# Patient Record
Sex: MALE | Race: WHITE | Employment: OTHER | URBAN - METROPOLITAN AREA
[De-identification: names, ages, dates, MRNs, and addresses within clinical notes are randomized per-mention and may not be internally consistent; named-entity substitution may affect disease eponyms.]

---

## 2018-08-01 ENCOUNTER — HOSPITAL ENCOUNTER (OUTPATIENT)
Dept: SLEEP CENTER | Age: 68
Discharge: HOME | End: 2018-08-01
Attending: INTERNAL MEDICINE | Admitting: INTERNAL MEDICINE

## 2018-08-01 VITALS
HEIGHT: 69 IN | WEIGHT: 278 LBS | OXYGEN SATURATION: 94 % | SYSTOLIC BLOOD PRESSURE: 132 MMHG | DIASTOLIC BLOOD PRESSURE: 63 MMHG | HEART RATE: 111 BPM | BODY MASS INDEX: 41.18 KG/M2

## 2018-08-01 DIAGNOSIS — G47.33 OSA (OBSTRUCTIVE SLEEP APNEA): ICD-10-CM

## 2018-08-01 DIAGNOSIS — Z87.891 EX-CIGARETTE SMOKER: ICD-10-CM

## 2018-08-01 DIAGNOSIS — E66.01 MORBID OBESITY (HCC): ICD-10-CM

## 2018-08-01 DIAGNOSIS — R06.09 DYSPNEA ON EXERTION: ICD-10-CM

## 2018-08-01 PROCEDURE — 99204 OFFICE O/P NEW MOD 45 MIN: CPT | Performed by: INTERNAL MEDICINE

## 2018-08-01 ASSESSMENT — SLEEP AND FATIGUE QUESTIONNAIRES
NECK CIRCUMFERENCE (INCHES): 19.5
ESS TOTAL SCORE: 11
HOW LIKELY ARE YOU TO NOD OFF OR FALL ASLEEP WHILE LYING DOWN TO REST IN THE AFTERNOON WHEN CIRCUMSTANCES PERMIT: 3
HOW LIKELY ARE YOU TO NOD OFF OR FALL ASLEEP IN A CAR, WHILE STOPPED FOR A FEW MINUTES IN TRAFFIC: 0
HOW LIKELY ARE YOU TO NOD OFF OR FALL ASLEEP WHILE SITTING QUIETLY AFTER LUNCH WITHOUT ALCOHOL: 2
HOW LIKELY ARE YOU TO NOD OFF OR FALL ASLEEP WHILE WATCHING TV: 3
HOW LIKELY ARE YOU TO NOD OFF OR FALL ASLEEP WHEN YOU ARE A PASSENGER IN A CAR FOR AN HOUR WITHOUT A BREAK: 1
HOW LIKELY ARE YOU TO NOD OFF OR FALL ASLEEP WHILE SITTING AND TALKING TO SOMEONE: 0
HOW LIKELY ARE YOU TO NOD OFF OR FALL ASLEEP WHILE SITTING INACTIVE IN A PUBLIC PLACE: 2
HOW LIKELY ARE YOU TO NOD OFF OR FALL ASLEEP WHILE SITTING AND READING: 0

## 2018-08-01 NOTE — CONSULTS
Social History Narrative    No narrative on file       Prior to Admission medications    Medication Sig Start Date End Date Taking?  Authorizing Provider   glipiZIDE (GLUCOTROL) 5 MG tablet Take 5 mg by mouth 2 times daily (before meals)   Yes Historical Provider, MD   losartan (COZAAR) 100 MG tablet Take 100 mg by mouth daily   Yes Historical Provider, MD   carvedilol (COREG) 25 MG tablet Take 25 mg by mouth 2 times daily (with meals)   Yes Historical Provider, MD   metFORMIN (GLUCOPHAGE) 500 MG tablet Take 1,000 mg by mouth 2 times daily (with meals)   Yes Historical Provider, MD   amLODIPine (NORVASC) 5 MG tablet Take 5 mg by mouth daily   Yes Historical Provider, MD   pravastatin (PRAVACHOL) 80 MG tablet Take 80 mg by mouth daily   Yes Historical Provider, MD   PARoxetine (PAXIL) 20 MG tablet Take 20 mg by mouth every morning   Yes Historical Provider, MD   cetirizine (ZYRTEC) 10 MG tablet Take 10 mg by mouth daily   Yes Historical Provider, MD   aspirin EC 81 MG EC tablet Take 1 tablet by mouth daily 7/7/16  Yes Gilma Ch MD   apixaban (ELIQUIS) 5 MG TABS tablet Take 1 tablet by mouth 2 times daily 7/7/16  Yes Gilma Ch MD   hydrochlorothiazide (HYDRODIURIL) 25 MG tablet Take 25 mg by mouth daily    Historical Provider, MD       Allergies as of 08/01/2018 - Review Complete 08/01/2018   Allergen Reaction Noted    Ace inhibitors Other (See Comments) 09/27/2010    Lipitor [atorvastatin] Hives 07/07/2016    Hydrochlorothiazide Rash 01/05/2018       Patient Active Problem List   Diagnosis    EZEKIEL (obstructive sleep apnea)    Ex-cigarette smoker    Dyspnea on exertion    Morbid obesity ([address]       Past Medical History:   Diagnosis Date    Arthritis     Diabetes mellitus ([facility]     Hyperlipidemia     Hypertension        Neck surgery for cancer in 1992-96    No significant Family history      Objective:     Vitals:    08/01/18 1317   BP: 132/63   Pulse: 111   SpO2: 94%   Weight: 278 lb (126.1 kg) Height: 5' 9\" (1.753 m)     Neck circumference: 19.5  Inches  [place] - Total score: 11    Gen: No distress. Eyes: PERRL. No sclera icterus. No conjunctival injection. ENT: No discharge. Pharynx clear. External appearance of ears and nose normal.OVERJET  Neck: Trachea midline. No obvious mass. Resp: No accessory muscle use. No crackles. No wheezes. No rhonchi. No dullness on percussion. CV: Regular rate. Regular rhythm. No murmur or rub. No edema. GI: Non-tender. Non-distended. No hernia. Skin: Warm, dry, normal texture and turgor. No nodule on exposed extremities. Lymph: No cervical LAD. No supraclavicular LAD. M/S: No cyanosis. No clubbing. No joint deformity. Psych: Oriented x 3. No anxiety. Awake. Alert. Intact judgement and insight.     Mallampati Airway Classification:   []1 []2 []3 [x]4        Sleep Complaints/Symptoms:        Duration of Sleep Complaints: 10 years      CPAP Usage:    [x]  Patient has never worn CPAP  []  Patient has worn CPAP previously but discontinued use  []  Current PAP user,  [years]   []  Patient Tolerates Well   []  Patient Does Not Tolerate     []  Patient Uses CPAP      []  More Than 4 Hours      []  Less Than 4 Hours  []  CPAP/BPAP/ASV Pressure Readings   []  CPAP Pressure      cm H20   []  BPAP Pressure       cm H20   []  ASV Pressure         cm H20      Assessment:      Diagnosis:       Plan:        Sleep Study:      []  HST - Home Sleep Study   [x]  PSG - Overnight Diagnostic Polysomnogram     []  CPAP Titration    [] Split Night Study    [] BiLevel Titration    [] ASV - Auto-Servo Ventilation Titration     []  PAP Nap Test       []  MSLT - Multiple Sleep Latency Test   []  MWT - Maintenance of Wakefulness Test    PAP Therapy:     []  Patient to be seen for new mask fitting/desensitization   []  AutoPAP Titration    []  CPAP supplies and equipment at ________cmH2O    []  Continue same CPAP pressure   []  Change CPAP pressure to _______cm H2O   []  CPAP

## 2018-08-07 PROBLEM — I49.5 TACHY-BRADY SYNDROME (HCC): Status: ACTIVE | Noted: 2018-08-07

## 2018-08-15 ENCOUNTER — HOSPITAL ENCOUNTER (OUTPATIENT)
Dept: SLEEP CENTER | Age: 68
Discharge: HOME | End: 2018-08-15
Attending: INTERNAL MEDICINE | Admitting: INTERNAL MEDICINE

## 2018-08-15 VITALS — BODY MASS INDEX: 41.18 KG/M2 | WEIGHT: 278 LBS | HEIGHT: 69 IN

## 2018-08-15 NOTE — ANESTHESIA PRE-OP
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 [place], wears hearing aids    RESPIRATORY: EZEKIEL, testing results pending. Former smoker, 2ppd x 26 years, quit 1992. Able to lie flat. CXR: 8/17/2018  No acute airspace disease. CARDIOVASCULAR: HTN, HLD, AF. Tachy-shahriar syndrome. Planned pacer insertion. On beta blocker, ASA, and Eliquis, last dose 8/18/2018    GASTROINTESTINAL:  Denies reflux    GENITOURINARY:  Neg     INTEGUMENT:  Psoriasis, elbows and knees. On Otezla. HEMATOLOGIC/LYMPHATIC:     ENDOCRINE: DM, last HbA1c      MUSCULOSKELETAL: OA, left hip. Right fx ankle, 1974. No hardware. Left rotator repair, full ROM    NEUROLOGICAL:  RLS    BEHAVIOR/PSYCH:   Anxiety. Daily ETOH, 2 shots of liquor per night. Alert and oriented x 4. Questions answered.  Understanding verbalized    PHYSICAL EXAM:  Airway Exam:  Head/Neck movement: full  Thyromental Distance: > 3 FB  History of difficult intubation:  None  Mallampati Classification:  Class II  Teeth: denies missing, loose or chipped teeth        LUNGS:  No increased work of breathing, good air exchange, clear to auscultation bilaterally, no crackles or wheezing  CARDIOVASCULAR:  Normal apical impulse, regular rate and rhythm, normal S1 and S2, no S3 or S4, and no murmur noted    Testing Results:    EKG:  Atrial fibrillation, HR 98  Incomplete right bundle branch block   LABS:      CBC  Lab Results   Component Value Date/Time    WBC 8.6 08/17/2018 08:19 AM    HGB 15.0 08/17/2018 08:19 AM    HCT 47.1 08/17/2018 08:19 AM     08/17/2018 08:19 AM     RENAL  Lab Results   Component Value Date/Time     08/17/2018 08:19 AM    K 5.2 (H) 08/17/2018 08:19 AM    CL 98 (L) 08/17/2018 08:19 AM    CO2 29 08/17/2018 08:19 AM    BUN 13 08/17/2018 08:19 AM    CREATININE 0.8 (L) 08/17/2018 08:19 AM    GLUCOSE 178 (H) 08/17/2018 08:19 AM     COAGS  Lab Results   Component Value Date/Time    PROTIME 11.7 08/17/2018 08:19 AM    INR 1.03 08/17/2018

## 2018-08-17 ENCOUNTER — HOSPITAL ENCOUNTER (OUTPATIENT)
Dept: PREADMISSION TESTING | Age: 68
Discharge: HOME | End: 2018-08-17
Attending: SURGERY | Admitting: SURGERY

## 2018-08-17 VITALS
SYSTOLIC BLOOD PRESSURE: 127 MMHG | DIASTOLIC BLOOD PRESSURE: 78 MMHG | HEIGHT: 69 IN | WEIGHT: 275 LBS | RESPIRATION RATE: 16 BRPM | OXYGEN SATURATION: 95 % | TEMPERATURE: 97.1 F | BODY MASS INDEX: 40.73 KG/M2 | HEART RATE: 86 BPM

## 2018-08-17 LAB
ANION GAP SERPL CALCULATED.3IONS-SCNC: 12 MMOL/L (ref 4–16)
APTT: 31.2 SECONDS (ref 21.2–33)
BUN BLDV-MCNC: 13 MG/DL (ref 6–23)
CALCIUM SERPL-MCNC: 10 MG/DL (ref 8.3–10.6)
CHLORIDE BLD-SCNC: 98 MMOL/L (ref 99–110)
CO2: 29 MMOL/L (ref 21–32)
CREAT SERPL-MCNC: 0.8 MG/DL (ref 0.9–1.3)
EKG ATRIAL RATE: 468 BPM
EKG DIAGNOSIS: NORMAL
EKG Q-T INTERVAL: 348 MS
EKG QRS DURATION: 96 MS
EKG QTC CALCULATION (BAZETT): 444 MS
EKG R AXIS: -9 DEGREES
EKG T AXIS: 2 DEGREES
EKG VENTRICULAR RATE: 98 BPM
GFR AFRICAN AMERICAN: >60 ML/MIN/1.73M2
GFR NON-AFRICAN AMERICAN: >60 ML/MIN/1.73M2
GLUCOSE BLD-MCNC: 178 MG/DL (ref 70–99)
HCT VFR BLD CALC: 47.1 % (ref 42–52)
HEMOGLOBIN: 15 GM/DL (ref 13.5–18)
INR BLD: 1.03 INDEX
MCH RBC QN AUTO: 32.3 PG (ref 27–31)
MCHC RBC AUTO-ENTMCNC: 31.8 % (ref 32–36)
MCV RBC AUTO: 101.3 FL (ref 78–100)
PDW BLD-RTO: 13.1 % (ref 11.7–14.9)
PLATELET # BLD: 279 K/CU MM (ref 140–440)
PMV BLD AUTO: 9.7 FL (ref 7.5–11.1)
POTASSIUM SERPL-SCNC: 5.2 MMOL/L (ref 3.5–5.1)
PROTHROMBIN TIME: 11.7 SECONDS (ref 9.12–12.5)
RBC # BLD: 4.65 M/CU MM (ref 4.6–6.2)
SODIUM BLD-SCNC: 139 MMOL/L (ref 135–145)
WBC # BLD: 8.6 K/CU MM (ref 4–10.5)

## 2018-08-17 RX ORDER — CHLORAL HYDRATE 500 MG
2000 CAPSULE ORAL 2 TIMES DAILY
COMMUNITY
End: 2019-06-20

## 2018-08-17 RX ORDER — BETAMETHASONE DIPROPIONATE 0.5 MG/G
CREAM TOPICAL DAILY
COMMUNITY

## 2018-08-20 ENCOUNTER — HOSPITAL ENCOUNTER (OUTPATIENT)
Dept: SLEEP CENTER | Age: 68
Discharge: HOME | End: 2018-08-20
Attending: INTERNAL MEDICINE | Admitting: INTERNAL MEDICINE

## 2018-08-21 NOTE — PROGRESS NOTES
8/21/2018  sleep study  for Casandra Sotomayor  1950 is complete. Results are pending physician review. Casandra Sotomayor, 1950, is scheduled for a follow up at [facility] on   October 17, 2018 at 1500 with Dr. Natalia Machado.       Electronically signed by Michael Plata on 8/21/2018 at 5:51 AM

## 2018-08-24 PROBLEM — R00.1 BRADYCARDIA: Status: ACTIVE | Noted: 2018-08-24

## 2018-10-10 ENCOUNTER — HOSPITAL ENCOUNTER (OUTPATIENT)
Dept: SLEEP CENTER | Age: 68
Discharge: HOME | End: 2018-10-10
Payer: COMMERCIAL

## 2018-10-10 DIAGNOSIS — R06.09 DYSPNEA ON EXERTION: ICD-10-CM

## 2018-10-10 DIAGNOSIS — G47.33 OSA (OBSTRUCTIVE SLEEP APNEA): ICD-10-CM

## 2018-10-10 DIAGNOSIS — E66.01 MORBID OBESITY (HCC): ICD-10-CM

## 2018-10-10 DIAGNOSIS — Z87.891 EX-CIGARETTE SMOKER: ICD-10-CM

## 2018-10-10 PROCEDURE — 99214 OFFICE O/P EST MOD 30 MIN: CPT | Performed by: INTERNAL MEDICINE

## 2018-10-10 ASSESSMENT — SLEEP AND FATIGUE QUESTIONNAIRES
ESS TOTAL SCORE: 4
HOW LIKELY ARE YOU TO NOD OFF OR FALL ASLEEP WHILE SITTING AND TALKING TO SOMEONE: 0
HOW LIKELY ARE YOU TO NOD OFF OR FALL ASLEEP WHILE LYING DOWN TO REST IN THE AFTERNOON WHEN CIRCUMSTANCES PERMIT: 3
HOW LIKELY ARE YOU TO NOD OFF OR FALL ASLEEP IN A CAR, WHILE STOPPED FOR A FEW MINUTES IN TRAFFIC: 0
HOW LIKELY ARE YOU TO NOD OFF OR FALL ASLEEP WHEN YOU ARE A PASSENGER IN A CAR FOR AN HOUR WITHOUT A BREAK: 0
HOW LIKELY ARE YOU TO NOD OFF OR FALL ASLEEP WHILE SITTING AND READING: 0
HOW LIKELY ARE YOU TO NOD OFF OR FALL ASLEEP WHILE WATCHING TV: 1
HOW LIKELY ARE YOU TO NOD OFF OR FALL ASLEEP WHILE SITTING INACTIVE IN A PUBLIC PLACE: 0
HOW LIKELY ARE YOU TO NOD OFF OR FALL ASLEEP WHILE SITTING QUIETLY AFTER LUNCH WITHOUT ALCOHOL: 0

## 2018-10-10 ASSESSMENT — ENCOUNTER SYMPTOMS
EYES NEGATIVE: 1
GASTROINTESTINAL NEGATIVE: 1
RESPIRATORY NEGATIVE: 1

## 2018-11-05 ENCOUNTER — TELEPHONE (OUTPATIENT)
Dept: CARDIOLOGY CLINIC | Age: 68
End: 2018-11-05

## 2018-11-07 ENCOUNTER — INITIAL CONSULT (OUTPATIENT)
Dept: CARDIOLOGY CLINIC | Age: 68
End: 2018-11-07
Payer: COMMERCIAL

## 2018-11-07 VITALS
WEIGHT: 280 LBS | DIASTOLIC BLOOD PRESSURE: 84 MMHG | SYSTOLIC BLOOD PRESSURE: 138 MMHG | HEART RATE: 84 BPM | HEIGHT: 69 IN | BODY MASS INDEX: 41.47 KG/M2

## 2018-11-07 DIAGNOSIS — R06.02 SOB (SHORTNESS OF BREATH): ICD-10-CM

## 2018-11-07 DIAGNOSIS — I48.19 PERSISTENT ATRIAL FIBRILLATION (HCC): Primary | ICD-10-CM

## 2018-11-07 PROCEDURE — 99204 OFFICE O/P NEW MOD 45 MIN: CPT | Performed by: INTERNAL MEDICINE

## 2018-11-07 PROCEDURE — 93000 ELECTROCARDIOGRAM COMPLETE: CPT | Performed by: INTERNAL MEDICINE

## 2018-11-07 RX ORDER — LEVOCETIRIZINE DIHYDROCHLORIDE 5 MG/1
5 TABLET, FILM COATED ORAL NIGHTLY
COMMUNITY

## 2018-11-07 RX ORDER — DILTIAZEM HYDROCHLORIDE 180 MG/1
180 CAPSULE, EXTENDED RELEASE ORAL DAILY
Status: ON HOLD | COMMUNITY
Start: 2018-10-31 | End: 2018-11-14 | Stop reason: HOSPADM

## 2018-11-08 ENCOUNTER — TELEPHONE (OUTPATIENT)
Dept: SLEEP CENTER | Age: 68
End: 2018-11-08

## 2018-11-12 ENCOUNTER — HOSPITAL ENCOUNTER (OUTPATIENT)
Dept: NON INVASIVE DIAGNOSTICS | Age: 68
Discharge: HOME | DRG: 309 | End: 2018-11-12
Payer: COMMERCIAL

## 2018-11-12 ENCOUNTER — ANESTHESIA (OUTPATIENT)
Dept: NON INVASIVE DIAGNOSTICS | Age: 68
End: 2018-11-12

## 2018-11-12 ENCOUNTER — HOSPITAL ENCOUNTER (OUTPATIENT)
Age: 68
Setting detail: SPECIMEN
Discharge: HOME | DRG: 309 | End: 2018-11-12
Payer: COMMERCIAL

## 2018-11-12 ENCOUNTER — ANESTHESIA EVENT (OUTPATIENT)
Dept: NON INVASIVE DIAGNOSTICS | Age: 68
End: 2018-11-12

## 2018-11-12 ENCOUNTER — HOSPITAL ENCOUNTER (INPATIENT)
Age: 68
LOS: 2 days | Discharge: HOME | DRG: 309 | End: 2018-11-14
Attending: INTERNAL MEDICINE | Admitting: INTERNAL MEDICINE
Payer: COMMERCIAL

## 2018-11-12 VITALS
HEART RATE: 91 BPM | RESPIRATION RATE: 21 BRPM | SYSTOLIC BLOOD PRESSURE: 132 MMHG | OXYGEN SATURATION: 95 % | DIASTOLIC BLOOD PRESSURE: 64 MMHG

## 2018-11-12 VITALS
DIASTOLIC BLOOD PRESSURE: 72 MMHG | OXYGEN SATURATION: 98 % | RESPIRATION RATE: 26 BRPM | SYSTOLIC BLOOD PRESSURE: 112 MMHG

## 2018-11-12 DIAGNOSIS — I48.21 PERMANENT ATRIAL FIBRILLATION (HCC): ICD-10-CM

## 2018-11-12 PROBLEM — I48.91 A-FIB (HCC): Status: ACTIVE | Noted: 2018-11-12

## 2018-11-12 LAB
ALBUMIN SERPL-MCNC: 4.3 GM/DL (ref 3.4–5)
ALP BLD-CCNC: 98 IU/L (ref 40–129)
ALT SERPL-CCNC: 30 U/L (ref 10–40)
ANION GAP SERPL CALCULATED.3IONS-SCNC: 16 MMOL/L (ref 4–16)
AST SERPL-CCNC: 13 IU/L (ref 15–37)
BASOPHILS ABSOLUTE: 0.1 K/CU MM
BASOPHILS RELATIVE PERCENT: 0.7 % (ref 0–1)
BILIRUB SERPL-MCNC: 0.5 MG/DL (ref 0–1)
BUN BLDV-MCNC: 17 MG/DL (ref 6–23)
CALCIUM SERPL-MCNC: 9.5 MG/DL (ref 8.3–10.6)
CHLORIDE BLD-SCNC: 96 MMOL/L (ref 99–110)
CHOLESTEROL: 201 MG/DL
CO2: 28 MMOL/L (ref 21–32)
CREAT SERPL-MCNC: 0.9 MG/DL (ref 0.9–1.3)
DIFFERENTIAL TYPE: ABNORMAL
EKG ATRIAL RATE: 104 BPM
EKG ATRIAL RATE: 241 BPM
EKG ATRIAL RATE: 241 BPM
EKG DIAGNOSIS: NORMAL
EKG P AXIS: 80 DEGREES
EKG P-R INTERVAL: 166 MS
EKG Q-T INTERVAL: 348 MS
EKG Q-T INTERVAL: 374 MS
EKG Q-T INTERVAL: 374 MS
EKG QRS DURATION: 114 MS
EKG QRS DURATION: 114 MS
EKG QRS DURATION: 126 MS
EKG QTC CALCULATION (BAZETT): 457 MS
EKG QTC CALCULATION (BAZETT): 465 MS
EKG QTC CALCULATION (BAZETT): 465 MS
EKG R AXIS: -11 DEGREES
EKG R AXIS: 2 DEGREES
EKG R AXIS: 2 DEGREES
EKG T AXIS: -13 DEGREES
EKG T AXIS: -13 DEGREES
EKG T AXIS: -21 DEGREES
EKG VENTRICULAR RATE: 104 BPM
EKG VENTRICULAR RATE: 93 BPM
EKG VENTRICULAR RATE: 93 BPM
EOSINOPHILS ABSOLUTE: 0.2 K/CU MM
EOSINOPHILS RELATIVE PERCENT: 2 % (ref 0–3)
ESTIMATED AVERAGE GLUCOSE: 157 MG/DL
GFR AFRICAN AMERICAN: >60 ML/MIN/1.73M2
GFR NON-AFRICAN AMERICAN: >60 ML/MIN/1.73M2
GLUCOSE BLD-MCNC: 131 MG/DL (ref 70–99)
GLUCOSE BLD-MCNC: 167 MG/DL (ref 70–99)
GLUCOSE BLD-MCNC: 199 MG/DL (ref 70–99)
GLUCOSE BLD-MCNC: 88 MG/DL (ref 70–99)
HBA1C MFR BLD: 7.1 % (ref 4.2–6.3)
HCT VFR BLD CALC: 43.8 % (ref 42–52)
HDLC SERPL-MCNC: 35 MG/DL
HEMOGLOBIN: 14 GM/DL (ref 13.5–18)
IMMATURE NEUTROPHIL %: 1.2 % (ref 0–0.43)
LDL CHOLESTEROL CALCULATED: 111 MG/DL
LV EF: 50 %
LVEF MODALITY: NORMAL
LYMPHOCYTES ABSOLUTE: 1.7 K/CU MM
LYMPHOCYTES RELATIVE PERCENT: 17.1 % (ref 24–44)
MAGNESIUM: 1.6 MG/DL (ref 1.8–2.4)
MCH RBC QN AUTO: 32.3 PG (ref 27–31)
MCHC RBC AUTO-ENTMCNC: 32 % (ref 32–36)
MCV RBC AUTO: 100.9 FL (ref 78–100)
MONOCYTES ABSOLUTE: 0.8 K/CU MM
MONOCYTES RELATIVE PERCENT: 8.1 % (ref 0–4)
NUCLEATED RBC %: 0 %
PDW BLD-RTO: 13.1 % (ref 11.7–14.9)
PLATELET # BLD: 266 K/CU MM (ref 140–440)
PMV BLD AUTO: 9.9 FL (ref 7.5–11.1)
POTASSIUM SERPL-SCNC: 4.5 MMOL/L (ref 3.5–5.1)
RBC # BLD: 4.34 M/CU MM (ref 4.6–6.2)
SEGMENTED NEUTROPHILS ABSOLUTE COUNT: 7.1 K/CU MM
SEGMENTED NEUTROPHILS RELATIVE PERCENT: 70.9 % (ref 36–66)
SODIUM BLD-SCNC: 140 MMOL/L (ref 135–145)
T3 FREE: 2.8 PG/ML (ref 2.3–4.2)
T4 FREE: 1.73 NG/DL (ref 0.9–1.8)
TOTAL IMMATURE NEUTOROPHIL: 0.12 K/CU MM
TOTAL NUCLEATED RBC: 0 K/CU MM
TOTAL PROTEIN: 6.5 GM/DL (ref 6.4–8.2)
TRIGL SERPL-MCNC: 274 MG/DL
TSH HIGH SENSITIVITY: 2.54 UIU/ML (ref 0.27–4.2)
WBC # BLD: 10 K/CU MM (ref 4–10.5)

## 2018-11-12 PROCEDURE — 36415 COLL VENOUS BLD VENIPUNCTURE: CPT

## 2018-11-12 PROCEDURE — 2580000003 HC RX 258: Performed by: NURSE ANESTHETIST, CERTIFIED REGISTERED

## 2018-11-12 PROCEDURE — 93005 ELECTROCARDIOGRAM TRACING: CPT | Performed by: INTERNAL MEDICINE

## 2018-11-12 PROCEDURE — 7100000001 HC PACU RECOVERY - ADDTL 15 MIN

## 2018-11-12 PROCEDURE — 3700000000 HC ANESTHESIA ATTENDED CARE

## 2018-11-12 PROCEDURE — 82962 GLUCOSE BLOOD TEST: CPT

## 2018-11-12 PROCEDURE — 93010 ELECTROCARDIOGRAM REPORT: CPT | Performed by: INTERNAL MEDICINE

## 2018-11-12 PROCEDURE — 6370000000 HC RX 637 (ALT 250 FOR IP): Performed by: INTERNAL MEDICINE

## 2018-11-12 PROCEDURE — 83036 HEMOGLOBIN GLYCOSYLATED A1C: CPT

## 2018-11-12 PROCEDURE — 93312 ECHO TRANSESOPHAGEAL: CPT

## 2018-11-12 PROCEDURE — 92960 CARDIOVERSION ELECTRIC EXT: CPT

## 2018-11-12 PROCEDURE — 83735 ASSAY OF MAGNESIUM: CPT

## 2018-11-12 PROCEDURE — 2580000003 HC RX 258: Performed by: INTERNAL MEDICINE

## 2018-11-12 PROCEDURE — G0378 HOSPITAL OBSERVATION PER HR: HCPCS

## 2018-11-12 PROCEDURE — 84443 ASSAY THYROID STIM HORMONE: CPT

## 2018-11-12 PROCEDURE — 96366 THER/PROPH/DIAG IV INF ADDON: CPT

## 2018-11-12 PROCEDURE — 3700000001 HC ADD 15 MINUTES (ANESTHESIA)

## 2018-11-12 PROCEDURE — 99232 SBSQ HOSP IP/OBS MODERATE 35: CPT | Performed by: INTERNAL MEDICINE

## 2018-11-12 PROCEDURE — 7100000000 HC PACU RECOVERY - FIRST 15 MIN

## 2018-11-12 PROCEDURE — 85025 COMPLETE CBC W/AUTO DIFF WBC: CPT

## 2018-11-12 PROCEDURE — 6360000002 HC RX W HCPCS: Performed by: NURSE ANESTHETIST, CERTIFIED REGISTERED

## 2018-11-12 PROCEDURE — 5A2204Z RESTORATION OF CARDIAC RHYTHM, SINGLE: ICD-10-PCS | Performed by: INTERNAL MEDICINE

## 2018-11-12 PROCEDURE — 96365 THER/PROPH/DIAG IV INF INIT: CPT

## 2018-11-12 PROCEDURE — 84439 ASSAY OF FREE THYROXINE: CPT

## 2018-11-12 PROCEDURE — 84481 FREE ASSAY (FT-3): CPT

## 2018-11-12 PROCEDURE — 2500000003 HC RX 250 WO HCPCS: Performed by: NURSE ANESTHETIST, CERTIFIED REGISTERED

## 2018-11-12 PROCEDURE — 2140000000 HC CCU INTERMEDIATE R&B

## 2018-11-12 PROCEDURE — 6360000002 HC RX W HCPCS

## 2018-11-12 PROCEDURE — 80053 COMPREHEN METABOLIC PANEL: CPT

## 2018-11-12 PROCEDURE — 6360000002 HC RX W HCPCS: Performed by: INTERNAL MEDICINE

## 2018-11-12 PROCEDURE — 85027 COMPLETE CBC AUTOMATED: CPT

## 2018-11-12 PROCEDURE — 80061 LIPID PANEL: CPT

## 2018-11-12 PROCEDURE — B245ZZ4 ULTRASONOGRAPHY OF LEFT HEART, TRANSESOPHAGEAL: ICD-10-PCS | Performed by: INTERNAL MEDICINE

## 2018-11-12 RX ORDER — ASPIRIN 81 MG/1
81 TABLET ORAL DAILY
Status: DISCONTINUED | OUTPATIENT
Start: 2018-11-12 | End: 2018-11-12 | Stop reason: SDUPTHER

## 2018-11-12 RX ORDER — SODIUM CHLORIDE 9 MG/ML
INJECTION, SOLUTION INTRAVENOUS CONTINUOUS PRN
Status: DISCONTINUED | OUTPATIENT
Start: 2018-11-12 | End: 2018-11-12 | Stop reason: SDUPTHER

## 2018-11-12 RX ORDER — PROPOFOL 10 MG/ML
INJECTION, EMULSION INTRAVENOUS PRN
Status: DISCONTINUED | OUTPATIENT
Start: 2018-11-12 | End: 2018-11-12 | Stop reason: SDUPTHER

## 2018-11-12 RX ORDER — LOSARTAN POTASSIUM 25 MG/1
25 TABLET ORAL EVERY MORNING
Status: DISCONTINUED | OUTPATIENT
Start: 2018-11-13 | End: 2018-11-14 | Stop reason: HOSPADM

## 2018-11-12 RX ORDER — LOSARTAN POTASSIUM 100 MG/1
100 TABLET ORAL EVERY MORNING
Status: DISCONTINUED | OUTPATIENT
Start: 2018-11-12 | End: 2018-11-12

## 2018-11-12 RX ORDER — SOTALOL HYDROCHLORIDE 80 MG/1
80 TABLET ORAL 2 TIMES DAILY
Status: DISCONTINUED | OUTPATIENT
Start: 2018-11-12 | End: 2018-11-14 | Stop reason: HOSPADM

## 2018-11-12 RX ORDER — PRAVASTATIN SODIUM 40 MG
40 TABLET ORAL NIGHTLY
Status: DISCONTINUED | OUTPATIENT
Start: 2018-11-12 | End: 2018-11-14 | Stop reason: HOSPADM

## 2018-11-12 RX ORDER — PAROXETINE HYDROCHLORIDE 20 MG/1
20 TABLET, FILM COATED ORAL EVERY MORNING
Status: DISCONTINUED | OUTPATIENT
Start: 2018-11-12 | End: 2018-11-14 | Stop reason: HOSPADM

## 2018-11-12 RX ORDER — GLIPIZIDE 5 MG/1
5 TABLET ORAL
Status: DISCONTINUED | OUTPATIENT
Start: 2018-11-12 | End: 2018-11-12 | Stop reason: SDUPTHER

## 2018-11-12 RX ORDER — CARVEDILOL 25 MG/1
25 TABLET ORAL 2 TIMES DAILY WITH MEALS
Status: DISCONTINUED | OUTPATIENT
Start: 2018-11-12 | End: 2018-11-14 | Stop reason: HOSPADM

## 2018-11-12 RX ORDER — SOTALOL HYDROCHLORIDE 80 MG/1
80 TABLET ORAL 2 TIMES DAILY
Status: DISCONTINUED | OUTPATIENT
Start: 2018-11-12 | End: 2018-11-12 | Stop reason: SDUPTHER

## 2018-11-12 RX ORDER — PRAVASTATIN SODIUM 40 MG
40 TABLET ORAL DAILY
Status: DISCONTINUED | OUTPATIENT
Start: 2018-11-12 | End: 2018-11-12 | Stop reason: SDUPTHER

## 2018-11-12 RX ORDER — LIDOCAINE HYDROCHLORIDE 20 MG/ML
INJECTION, SOLUTION INFILTRATION; PERINEURAL PRN
Status: DISCONTINUED | OUTPATIENT
Start: 2018-11-12 | End: 2018-11-12 | Stop reason: SDUPTHER

## 2018-11-12 RX ORDER — NICOTINE POLACRILEX 4 MG
15 LOZENGE BUCCAL PRN
Status: DISCONTINUED | OUTPATIENT
Start: 2018-11-12 | End: 2018-11-12 | Stop reason: SDUPTHER

## 2018-11-12 RX ORDER — DEXTROSE MONOHYDRATE 50 MG/ML
100 INJECTION, SOLUTION INTRAVENOUS PRN
Status: DISCONTINUED | OUTPATIENT
Start: 2018-11-12 | End: 2018-11-14 | Stop reason: HOSPADM

## 2018-11-12 RX ORDER — GLIPIZIDE 5 MG/1
5 TABLET ORAL
Status: DISCONTINUED | OUTPATIENT
Start: 2018-11-12 | End: 2018-11-12

## 2018-11-12 RX ORDER — DEXTROSE MONOHYDRATE 25 G/50ML
12.5 INJECTION, SOLUTION INTRAVENOUS PRN
Status: DISCONTINUED | OUTPATIENT
Start: 2018-11-12 | End: 2018-11-12 | Stop reason: SDUPTHER

## 2018-11-12 RX ORDER — GLIPIZIDE 5 MG/1
5 TABLET ORAL
Status: DISCONTINUED | OUTPATIENT
Start: 2018-11-12 | End: 2018-11-14 | Stop reason: HOSPADM

## 2018-11-12 RX ORDER — DEXTROSE MONOHYDRATE 25 G/50ML
12.5 INJECTION, SOLUTION INTRAVENOUS PRN
Status: DISCONTINUED | OUTPATIENT
Start: 2018-11-12 | End: 2018-11-14 | Stop reason: HOSPADM

## 2018-11-12 RX ORDER — MAGNESIUM SULFATE IN WATER 40 MG/ML
2 INJECTION, SOLUTION INTRAVENOUS ONCE
Status: COMPLETED | OUTPATIENT
Start: 2018-11-12 | End: 2018-11-12

## 2018-11-12 RX ORDER — CARVEDILOL 25 MG/1
25 TABLET ORAL 2 TIMES DAILY WITH MEALS
Status: DISCONTINUED | OUTPATIENT
Start: 2018-11-12 | End: 2018-11-12 | Stop reason: SDUPTHER

## 2018-11-12 RX ORDER — ASPIRIN 81 MG/1
81 TABLET ORAL DAILY
Status: DISCONTINUED | OUTPATIENT
Start: 2018-11-12 | End: 2018-11-14 | Stop reason: HOSPADM

## 2018-11-12 RX ORDER — SODIUM CHLORIDE 9 MG/ML
INJECTION, SOLUTION INTRAVENOUS CONTINUOUS
Status: DISCONTINUED | OUTPATIENT
Start: 2018-11-12 | End: 2018-11-13

## 2018-11-12 RX ORDER — NICOTINE POLACRILEX 4 MG
15 LOZENGE BUCCAL PRN
Status: DISCONTINUED | OUTPATIENT
Start: 2018-11-12 | End: 2018-11-14 | Stop reason: HOSPADM

## 2018-11-12 RX ORDER — SODIUM CHLORIDE 9 MG/ML
INJECTION, SOLUTION INTRAVENOUS CONTINUOUS
Status: DISCONTINUED | OUTPATIENT
Start: 2018-11-12 | End: 2018-11-13 | Stop reason: HOSPADM

## 2018-11-12 RX ORDER — PAROXETINE HYDROCHLORIDE 20 MG/1
20 TABLET, FILM COATED ORAL EVERY MORNING
Status: DISCONTINUED | OUTPATIENT
Start: 2018-11-12 | End: 2018-11-12 | Stop reason: SDUPTHER

## 2018-11-12 RX ORDER — LOSARTAN POTASSIUM 100 MG/1
100 TABLET ORAL EVERY MORNING
Status: DISCONTINUED | OUTPATIENT
Start: 2018-11-12 | End: 2018-11-12 | Stop reason: SDUPTHER

## 2018-11-12 RX ORDER — DEXTROSE MONOHYDRATE 50 MG/ML
100 INJECTION, SOLUTION INTRAVENOUS PRN
Status: DISCONTINUED | OUTPATIENT
Start: 2018-11-12 | End: 2018-11-12 | Stop reason: SDUPTHER

## 2018-11-12 RX ADMIN — PAROXETINE HYDROCHLORIDE 20 MG: 20 TABLET, FILM COATED ORAL at 12:22

## 2018-11-12 RX ADMIN — ASPIRIN 81 MG: 81 TABLET, COATED ORAL at 12:22

## 2018-11-12 RX ADMIN — METFORMIN HYDROCHLORIDE 1000 MG: 500 TABLET ORAL at 17:21

## 2018-11-12 RX ADMIN — APIXABAN 5 MG: 5 TABLET, FILM COATED ORAL at 12:22

## 2018-11-12 RX ADMIN — CARVEDILOL 25 MG: 25 TABLET, FILM COATED ORAL at 17:21

## 2018-11-12 RX ADMIN — GLIPIZIDE 5 MG: 5 TABLET ORAL at 17:21

## 2018-11-12 RX ADMIN — SODIUM CHLORIDE: 9 INJECTION, SOLUTION INTRAVENOUS at 08:39

## 2018-11-12 RX ADMIN — SOTALOL HYDROCHLORIDE 80 MG: 80 TABLET ORAL at 21:34

## 2018-11-12 RX ADMIN — MAGNESIUM SULFATE HEPTAHYDRATE 2 G: 40 INJECTION, SOLUTION INTRAVENOUS at 12:22

## 2018-11-12 RX ADMIN — PROPOFOL 50 MG: 10 INJECTION, EMULSION INTRAVENOUS at 08:43

## 2018-11-12 RX ADMIN — METFORMIN HYDROCHLORIDE 1000 MG: 500 TABLET ORAL at 12:22

## 2018-11-12 RX ADMIN — APIXABAN 5 MG: 5 TABLET, FILM COATED ORAL at 21:34

## 2018-11-12 RX ADMIN — LIDOCAINE HYDROCHLORIDE 100 MG: 20 INJECTION, SOLUTION INFILTRATION; PERINEURAL at 08:41

## 2018-11-12 RX ADMIN — SODIUM CHLORIDE: 9 INJECTION, SOLUTION INTRAVENOUS at 12:22

## 2018-11-12 RX ADMIN — CARVEDILOL 25 MG: 25 TABLET, FILM COATED ORAL at 09:27

## 2018-11-12 RX ADMIN — PROPOFOL 50 MG: 10 INJECTION, EMULSION INTRAVENOUS at 08:41

## 2018-11-12 RX ADMIN — SODIUM CHLORIDE: 9 INJECTION, SOLUTION INTRAVENOUS at 00:00

## 2018-11-12 RX ADMIN — SOTALOL HYDROCHLORIDE 80 MG: 80 TABLET ORAL at 09:28

## 2018-11-12 RX ADMIN — PROPOFOL 60 MG: 10 INJECTION, EMULSION INTRAVENOUS at 08:47

## 2018-11-12 RX ADMIN — PRAVASTATIN SODIUM 40 MG: 40 TABLET ORAL at 21:34

## 2018-11-12 ASSESSMENT — PAIN SCALES - GENERAL
PAINLEVEL_OUTOF10: 0

## 2018-11-12 NOTE — ANESTHESIA PRE PROCEDURE
tablet Take 1 tablet by mouth 2 times daily 7/7/16   Nicol Romero MD       Current medications:    Current Outpatient Prescriptions   Medication Sig Dispense Refill    diltiazem (TIAZAC) 180 MG extended release capsule Take 180 mg by mouth daily      levocetirizine (XYZAL) 5 MG tablet Take 5 mg by mouth nightly      Multiple Vitamins-Minerals (ALIVE ENERGY 50+ PO) Take by mouth daily      Apremilast (OTEZLA) 30 MG TABS Take 30 mg by mouth 2 times daily      Omega-3 1000 MG CAPS Take 2,000 mg by mouth 2 times daily      betamethasone dipropionate (DIPROLENE) 0.05 % cream Apply topically daily Apply topically 2 times daily.       NONFORMULARY daily triaminicolene ointment      vitamin D (CHOLECALCIFEROL) 1000 UNIT TABS tablet Take 1,000 Units by mouth once a week      glipiZIDE (GLUCOTROL) 5 MG tablet Take 5 mg by mouth 2 times daily (before meals)      losartan (COZAAR) 100 MG tablet Take 100 mg by mouth every morning       carvedilol (COREG) 25 MG tablet Take 25 mg by mouth 2 times daily (with meals)      metFORMIN (GLUCOPHAGE) 500 MG tablet Take 1,000 mg by mouth 2 times daily (with meals)      pravastatin (PRAVACHOL) 40 MG tablet Take 40 mg by mouth daily       PARoxetine (PAXIL) 20 MG tablet Take 20 mg by mouth every morning      aspirin EC 81 MG EC tablet Take 1 tablet by mouth daily 30 tablet 3    apixaban (ELIQUIS) 5 MG TABS tablet Take 1 tablet by mouth 2 times daily 60 tablet 11     Current Facility-Administered Medications   Medication Dose Route Frequency Provider Last Rate Last Dose    apixaban (ELIQUIS) tablet 5 mg  5 mg Oral BID Nicol Romero MD        Apremilast TABS 30 mg  30 mg Oral BID Nicol Romero MD        aspirin EC tablet 81 mg  81 mg Oral Daily Nicol Romero MD        carvedilol (COREG) tablet 25 mg  25 mg Oral BID WC Nicol Romero MD        glipiZIDE (GLUCOTROL) tablet 5 mg  5 mg Oral BID AC Lu Bills MD        losartan (COZAAR) tablet 100 mg  100 mg Oral [facility] COLONOSCOPY  2018    CYSTOSCOPY  1980s    HAND SURGERY Right 1970    blood clot removed from palm of hand from hitting hard surface    HAND TENDON SURGERY Right 1970    MOUTH SURGERY  1992    tumors removed from roof of mouth \"benign\"   [address]    tumor removed, adenocarcinoma    PACEMAKER INSERTION Left 08/24/2018    ROTATOR CUFF REPAIR Left     UMBILICAL HERNIA REPAIR  1970s       Social History:    Social History   Substance Use Topics    Smoking status: Former Smoker     Packs/day: 2.00     Years: 26.00     Types: Cigarettes     Start date: 1966     Quit date: 7/7/1992    Smokeless tobacco: Never Used    Alcohol use 8.4 oz/week     14 Shots of liquor per week      Comment: about 2 shots a night for 20 years                                Counseling given: Not Answered      Vital Signs (Current):   Vitals:    11/12/18 0710   BP: (!) 146/85   Pulse: 95   Resp: 24   SpO2: 99%                                              BP Readings from Last 3 Encounters:   11/12/18 (!) 146/85   11/07/18 138/84   09/04/18 132/80       NPO Status:                                                                                 BMI:   Wt Readings from Last 3 Encounters:   11/07/18 280 lb (127 kg)   09/04/18 277 lb (125.6 kg)   08/24/18 275 lb (124.7 kg)     There is no height or weight on file to calculate BMI.    CBC:   Lab Results   Component Value Date    WBC 10.0 11/12/2018    RBC 4.34 11/12/2018    HGB 14.0 11/12/2018    HCT 43.8 11/12/2018    .9 11/12/2018    RDW 13.1 11/12/2018     11/12/2018       CMP:   Lab Results   Component Value Date     08/17/2018    K 5.2 08/17/2018    CL 98 08/17/2018    CO2 29 08/17/2018    BUN 13 08/17/2018    CREATININE 0.8 08/17/2018    GFRAA >60 08/17/2018    LABGLOM >60 08/17/2018    GLUCOSE 178 08/17/2018    CALCIUM 10.0 08/17/2018       POC Tests: No results for input(s): POCGLU, POCNA, POCK, POCCL, POCBUN, POCHEMO, POCHCT in the last 72

## 2018-11-12 NOTE — H&P
[address] [place] [address], [address] [place] [address]                              HISTORY AND PHYSICAL    PATIENT NAME: Charity Fierro                      :        1950  MED REC NO:   [id number]                          ROOM:  ACCOUNT NO:   [de-identified]                           ADMIT DATE: 2018  PROVIDER:     Jasmeet Thomason MD    INDICATION:  The patient is here for LUIS cardioversion. HISTORY OF PRESENT ILLNESS:  This is a 70-year-old male patient with a  history of atrial fibrillation status post pacemaker placement for  tachybrady syndrome. The patient was seen by Dr. Cathie Brownlee. Dr. Cathie Brownlee  recommended that the patient be cardioverted at least once _____  treatment and then if that fails then probably consider doing AV imani  ablation possibly upgrading to BiV pacer at that time. So, the patient  is here for that. The patient had a heart catheterization back in . At that time,  found to have mild coronary artery disease noted. The patient is  treated medically. The patient had echo done in 2018, LV function  was preserved. The patient had a dual chamber pacemaker placement,  [employer] device. The patient's last stress test was done in 2018,  negative for ischemia. EF was 41%. The patient has a history of sleep  apnea also present. MEDICATIONS:  The patient is on aspirin 81 mg a day, Coreg 25 mg b.i.d.,  Eliquis 5 mg b.i.d., fish oil, glipizide, losartan 100 mg once a day,  metformin 1000 mg b.i.d., Otezla 30 mg b.i.d., Paxil, pravastatin, and  Xyzal 5 mg as needed. ALLERGIES:  ACE INHIBITORS, LIPITOR, and HYDROCHLOROTHIAZIDE. PAST MEDICAL HISTORY:  The patient has a history of sleep apnea, sees  Dr. Lyndsay Barrios. The patient with atrial fibrillation, tachybrady syndrome  status post pacemaker placement, history of hypertension,  hyperlipidemia, diabetes, and obesity present.     PAST SURGICAL HISTORY:  Pacemaker

## 2018-11-13 LAB
EKG ATRIAL RATE: 94 BPM
EKG DIAGNOSIS: NORMAL
EKG P AXIS: 71 DEGREES
EKG P-R INTERVAL: 172 MS
EKG Q-T INTERVAL: 374 MS
EKG QRS DURATION: 104 MS
EKG QTC CALCULATION (BAZETT): 467 MS
EKG R AXIS: -22 DEGREES
EKG T AXIS: -1 DEGREES
EKG VENTRICULAR RATE: 94 BPM
GLUCOSE BLD-MCNC: 112 MG/DL (ref 70–99)
GLUCOSE BLD-MCNC: 155 MG/DL (ref 70–99)
GLUCOSE BLD-MCNC: 162 MG/DL (ref 70–99)
GLUCOSE BLD-MCNC: 94 MG/DL (ref 70–99)

## 2018-11-13 PROCEDURE — 82962 GLUCOSE BLOOD TEST: CPT

## 2018-11-13 PROCEDURE — 6370000000 HC RX 637 (ALT 250 FOR IP): Performed by: INTERNAL MEDICINE

## 2018-11-13 PROCEDURE — 99231 SBSQ HOSP IP/OBS SF/LOW 25: CPT | Performed by: INTERNAL MEDICINE

## 2018-11-13 PROCEDURE — 2140000000 HC CCU INTERMEDIATE R&B

## 2018-11-13 PROCEDURE — G0378 HOSPITAL OBSERVATION PER HR: HCPCS

## 2018-11-13 RX ORDER — LEVOCETIRIZINE DIHYDROCHLORIDE 5 MG/1
5 TABLET, FILM COATED ORAL NIGHTLY
Status: DISCONTINUED | OUTPATIENT
Start: 2018-11-13 | End: 2018-11-14 | Stop reason: HOSPADM

## 2018-11-13 RX ADMIN — CARVEDILOL 25 MG: 25 TABLET, FILM COATED ORAL at 10:07

## 2018-11-13 RX ADMIN — CARVEDILOL 25 MG: 25 TABLET, FILM COATED ORAL at 17:24

## 2018-11-13 RX ADMIN — PAROXETINE HYDROCHLORIDE 20 MG: 20 TABLET, FILM COATED ORAL at 10:08

## 2018-11-13 RX ADMIN — LEVOCETIRIZINE DIHYDROCHLORIDE 5 MG: 5 TABLET, FILM COATED ORAL at 21:24

## 2018-11-13 RX ADMIN — APIXABAN 5 MG: 5 TABLET, FILM COATED ORAL at 10:08

## 2018-11-13 RX ADMIN — PRAVASTATIN SODIUM 40 MG: 40 TABLET ORAL at 21:25

## 2018-11-13 RX ADMIN — LOSARTAN POTASSIUM 25 MG: 25 TABLET ORAL at 10:08

## 2018-11-13 RX ADMIN — APIXABAN 5 MG: 5 TABLET, FILM COATED ORAL at 21:25

## 2018-11-13 RX ADMIN — SOTALOL HYDROCHLORIDE 80 MG: 80 TABLET ORAL at 10:09

## 2018-11-13 RX ADMIN — GLIPIZIDE 5 MG: 5 TABLET ORAL at 10:08

## 2018-11-13 RX ADMIN — SOTALOL HYDROCHLORIDE 80 MG: 80 TABLET ORAL at 21:25

## 2018-11-13 RX ADMIN — ASPIRIN 81 MG: 81 TABLET, COATED ORAL at 10:08

## 2018-11-13 RX ADMIN — METFORMIN HYDROCHLORIDE 1000 MG: 500 TABLET ORAL at 10:08

## 2018-11-13 ASSESSMENT — PAIN SCALES - GENERAL
PAINLEVEL_OUTOF10: 0
PAINLEVEL_OUTOF10: 0

## 2018-11-13 NOTE — PROCEDURES
[address] [place] [address], [address] [place] [address]                                 ECHOCARDIOGRAM    PATIENT NAME: Leon Mack                      :        1950  MED REC NO:   [id number]                          ROOM:  ACCOUNT NO:   [de-identified]                           ADMIT DATE: 2018  PROVIDER:     Indu Hernández MD    CORRECTED CSN- [facility]    INDICATION:  Atrial fibrillation. This is a 59-year-old male patient brought to noninvasive cath lab  today. Sedation was provided by Anesthesia. A mouthguard was placed  and LUIS probe was advanced to the posterior pharynx and mid esophagus. Images were obtained. Left atrium is moderately enlarged. Some spontaneous echodensity noted  in the left atrium, but no clot or thrombus noted in the left atrium or  left atrial appendage. Moderate mitral regurgitation noted. There are  two jets of mitral regurgitation present. Both are small-to-moderate  present. Moderate mitral regurgitation noted. LV cavity is normal in  size. EF is around 50% range. No clot or thrombus noted in the left  atrium, left atrial appendage, or LV cavity. Interatrial septum is  intact. No ASD or PFO noted in the color Doppler or bubble study. Tricuspid valve is normal.  Aortic valve is sclerotic, but not stenotic. Pulmonic valve is grossly normal.  Mild plaque is noted in the  descending aorta. LUIS probe was removed with no complication. The  patient proceeded with cardioversion. The patient was successfully  cardioverted from atrial fibrillation to sinus rhythm with one shock of  200 joules. IMPRESSION:  1. No clot or thrombus noted in the left atrium, left atrial appendage  or LV cavity. 2.  EF is around 50%. 3.  Moderate mitral regurgitation present. 4.  No ASD or PFO was present.     The patient was cardioverted successfully from atrial fibrillation to  sinus rhythm with no

## 2018-11-14 VITALS
OXYGEN SATURATION: 96 % | DIASTOLIC BLOOD PRESSURE: 90 MMHG | TEMPERATURE: 97.4 F | HEIGHT: 69 IN | RESPIRATION RATE: 15 BRPM | BODY MASS INDEX: 41.06 KG/M2 | SYSTOLIC BLOOD PRESSURE: 145 MMHG | HEART RATE: 79 BPM | WEIGHT: 277.2 LBS

## 2018-11-14 LAB
ANION GAP SERPL CALCULATED.3IONS-SCNC: 12 MMOL/L (ref 4–16)
BUN BLDV-MCNC: 14 MG/DL (ref 6–23)
CALCIUM SERPL-MCNC: 9.7 MG/DL (ref 8.3–10.6)
CHLORIDE BLD-SCNC: 100 MMOL/L (ref 99–110)
CO2: 28 MMOL/L (ref 21–32)
CREAT SERPL-MCNC: 0.8 MG/DL (ref 0.9–1.3)
EKG ATRIAL RATE: 79 BPM
EKG DIAGNOSIS: NORMAL
EKG P AXIS: 84 DEGREES
EKG P-R INTERVAL: 176 MS
EKG Q-T INTERVAL: 406 MS
EKG QRS DURATION: 100 MS
EKG QTC CALCULATION (BAZETT): 465 MS
EKG R AXIS: -19 DEGREES
EKG T AXIS: -13 DEGREES
EKG VENTRICULAR RATE: 79 BPM
GFR AFRICAN AMERICAN: >60 ML/MIN/1.73M2
GFR NON-AFRICAN AMERICAN: >60 ML/MIN/1.73M2
GLUCOSE BLD-MCNC: 111 MG/DL (ref 70–99)
GLUCOSE BLD-MCNC: 126 MG/DL (ref 70–99)
GLUCOSE BLD-MCNC: 160 MG/DL (ref 70–99)
GLUCOSE BLD-MCNC: 173 MG/DL (ref 70–99)
POTASSIUM SERPL-SCNC: 4.5 MMOL/L (ref 3.5–5.1)
SODIUM BLD-SCNC: 140 MMOL/L (ref 135–145)

## 2018-11-14 PROCEDURE — G0378 HOSPITAL OBSERVATION PER HR: HCPCS

## 2018-11-14 PROCEDURE — 80048 BASIC METABOLIC PNL TOTAL CA: CPT

## 2018-11-14 PROCEDURE — 82962 GLUCOSE BLOOD TEST: CPT

## 2018-11-14 PROCEDURE — 6370000000 HC RX 637 (ALT 250 FOR IP): Performed by: INTERNAL MEDICINE

## 2018-11-14 PROCEDURE — 36415 COLL VENOUS BLD VENIPUNCTURE: CPT

## 2018-11-14 RX ORDER — LOSARTAN POTASSIUM 25 MG/1
25 TABLET ORAL EVERY MORNING
Qty: 30 TABLET | Refills: 3 | Status: SHIPPED | OUTPATIENT
Start: 2018-11-14

## 2018-11-14 RX ORDER — CARVEDILOL 25 MG/1
25 TABLET ORAL 2 TIMES DAILY WITH MEALS
Qty: 60 TABLET | Refills: 3 | Status: SHIPPED | OUTPATIENT
Start: 2018-11-14

## 2018-11-14 RX ORDER — SOTALOL HYDROCHLORIDE 80 MG/1
80 TABLET ORAL 2 TIMES DAILY
Qty: 60 TABLET | Refills: 3 | Status: SHIPPED | OUTPATIENT
Start: 2018-11-14

## 2018-11-14 RX ADMIN — APIXABAN 5 MG: 5 TABLET, FILM COATED ORAL at 09:08

## 2018-11-14 RX ADMIN — PAROXETINE HYDROCHLORIDE 20 MG: 20 TABLET, FILM COATED ORAL at 09:07

## 2018-11-14 RX ADMIN — SOTALOL HYDROCHLORIDE 80 MG: 80 TABLET ORAL at 09:07

## 2018-11-14 RX ADMIN — METFORMIN HYDROCHLORIDE 1000 MG: 500 TABLET ORAL at 16:32

## 2018-11-14 RX ADMIN — ASPIRIN 81 MG: 81 TABLET, COATED ORAL at 09:07

## 2018-11-14 RX ADMIN — GLIPIZIDE 5 MG: 5 TABLET ORAL at 16:32

## 2018-11-14 RX ADMIN — CARVEDILOL 25 MG: 25 TABLET, FILM COATED ORAL at 09:08

## 2018-11-14 RX ADMIN — GLIPIZIDE 5 MG: 5 TABLET ORAL at 09:07

## 2018-11-14 RX ADMIN — LOSARTAN POTASSIUM 25 MG: 25 TABLET ORAL at 09:08

## 2018-11-14 RX ADMIN — SOTALOL HYDROCHLORIDE 80 MG: 80 TABLET ORAL at 16:32

## 2018-11-14 RX ADMIN — METFORMIN HYDROCHLORIDE 1000 MG: 500 TABLET ORAL at 09:07

## 2018-11-14 RX ADMIN — CARVEDILOL 25 MG: 25 TABLET, FILM COATED ORAL at 16:32

## 2018-11-14 ASSESSMENT — PAIN SCALES - GENERAL
PAINLEVEL_OUTOF10: 0
PAINLEVEL_OUTOF10: 0

## 2018-11-14 NOTE — PROGRESS NOTES
Clarified with Dr. Heather Malin. Negar what dose of Coreg he wanted to send pt home on, new discharge orders received.
Daily Progress Note     patient is awake alert feeling better  afib cardioverted to sinus   Remain in sinus rate stable  Increase activty  Home tomorrow  EKG today stable   Keep on current meds  EZEKIEL on BIPAP  HTN and diabetes   S/p PPM stable    Summary   Left ventricular function is normal , EF is estimated at 50%.   Bubble study was negative, demonstrating no evidence of an ASD or PFO. Beaulah Males mitral regurgitation is present.  Alejandro Hooks was not a thrombus seen in the MARVEL, therefore we proceeded with the   cardioversion.   The patient was shocked once using 200 synchronized joules and was   successfully converted to sinus rhythm      Objective:   /79   Pulse 99   Temp 98.3 °F (36.8 °C)   Resp 21   Ht 5' 9\" (1.753 m)   Wt 277 lb (125.6 kg)   SpO2 98%   BMI 40.91 kg/m²     Intake/Output Summary (Last 24 hours) at 11/13/18 1240  Last data filed at 11/13/18 1006   Gross per 24 hour   Intake          3673.67 ml   Output                0 ml   Net          3673.67 ml       Medications:   Scheduled Meds:   apixaban  5 mg Oral BID    Apremilast  30 mg Oral BID    aspirin EC  81 mg Oral Daily    carvedilol  25 mg Oral BID WC    metFORMIN  1,000 mg Oral BID WC    PARoxetine  20 mg Oral QAM    pravastatin  40 mg Oral Nightly    insulin lispro  0-6 Units Subcutaneous TID WC    insulin lispro  0-3 Units Subcutaneous Nightly    sotalol  80 mg Oral BID    losartan  25 mg Oral QAM    glipiZIDE  5 mg Oral BID AC    [START ON 11/18/2018] vitamin D  10,000 Units Oral Weekly      Infusions:   dextrose        PRN Meds:  glucose, dextrose, glucagon (rDNA), dextrose       Physical Exam:  Vitals:    11/13/18 0850   BP: 131/79   Pulse: 99   Resp: 21   Temp: 98.3 °F (36.8 °C)   SpO2: 98%        General: AAO, NAD  Chest: Nontender  Cardiac: First and Second Heart Sounds are Normal, No Murmurs or Gallops noted  Lungs:Clear to auscultation and percussion.   Abdomen: Soft, NT, ND, +BS  Extremities: No clubbing, no
Infusions:    dextrose           Objective:   Vitals: BP (!) 125/58   Pulse 79   Temp 97.8 °F (36.6 °C) (Axillary)   Resp 15   Ht 5' 9\" (1.753 m)   Wt 277 lb 3.2 oz (125.7 kg)   SpO2 96%   BMI 40.94 kg/m²   General appearance: alert and cooperative with exam  Neck: no JVD or bruit  Thyroid : Normal lobes   Lungs: Has Vesicular Breath sounds   Heart:  regular rate and rhythm  Abdomen: soft, non-tender; bowel sounds normal; no masses,  no organomegaly  Musculoskeletal: Normal  Extremities: extremities normal, , no edema  Neurologic:  Awake, alert, oriented to name, place and time. Cranial nerves II-XII are grossly intact. Motor is  intact. Sensory is intact. ,  and gait is normal.    Assessment:     Patient Active Problem List:     EZEKIEL (obstructive sleep apnea)     Ex-cigarette smoker     Dyspnea on exertion     Morbid obesity (HCC)     Tachy-shahriar syndrome (HCC)     Bradycardia     A-fib ([address]    DM       Plan:     1. Reviewed POC blood glucose . Labs and X ray results   2. Reviewed Current Medicines   3. On Correction bolus Humalog Insulin regime / and Oral Hypoglycemic drugs   4. Monitor Blood glucose frequently   5. Modified  the dose of Insulin/ other medicines as needed   6. Will follow   7. Possible discharge today     .      Coral Almanza MD

## 2018-11-15 NOTE — DISCHARGE SUMMARY
[facility] --[phone]  AFIB TO SINUS  BETAPACE TREATMENT INITIATION   DIABETES  HTN  EZEKIEL AND COPD  STABLE CONDITION  ACTIVITY AS TOLERATED  F/U IN OFFICE NEXT WEEK   DIABETIC DIET
Doc#: [id number]    CC:  <>

## 2018-11-25 ASSESSMENT — ENCOUNTER SYMPTOMS
WHEEZING: 0
CONSTIPATION: 0
VOMITING: 0
BACK PAIN: 0
SHORTNESS OF BREATH: 1
PHOTOPHOBIA: 0
BLOOD IN STOOL: 0
ABDOMINAL PAIN: 0
CHEST TIGHTNESS: 0
NAUSEA: 0
EYE PAIN: 0
COLOR CHANGE: 0
COUGH: 0
DIARRHEA: 0

## 2019-03-20 ENCOUNTER — TELEPHONE (OUTPATIENT)
Dept: CARDIOLOGY CLINIC | Age: 69
End: 2019-03-20

## 2019-06-20 ENCOUNTER — HOSPITAL ENCOUNTER (OUTPATIENT)
Dept: SLEEP CENTER | Age: 69
Discharge: HOME | End: 2019-06-20
Payer: COMMERCIAL

## 2019-06-20 DIAGNOSIS — E66.01 MORBID OBESITY (HCC): ICD-10-CM

## 2019-06-20 DIAGNOSIS — R06.09 DYSPNEA ON EXERTION: ICD-10-CM

## 2019-06-20 DIAGNOSIS — G47.33 OSA (OBSTRUCTIVE SLEEP APNEA): ICD-10-CM

## 2019-06-20 DIAGNOSIS — Z87.891 EX-CIGARETTE SMOKER: ICD-10-CM

## 2019-06-20 PROCEDURE — 99214 OFFICE O/P EST MOD 30 MIN: CPT | Performed by: INTERNAL MEDICINE

## 2019-06-20 PROCEDURE — 9990000010 HC NO CHARGE VISIT: Performed by: INTERNAL MEDICINE

## 2019-06-20 ASSESSMENT — ENCOUNTER SYMPTOMS
ABDOMINAL PAIN: 0
ABDOMINAL DISTENTION: 0
EYE DISCHARGE: 0
SHORTNESS OF BREATH: 0
COUGH: 0
BACK PAIN: 0
EYE ITCHING: 0

## 2019-06-20 ASSESSMENT — SLEEP AND FATIGUE QUESTIONNAIRES
ESS TOTAL SCORE: 4
HOW LIKELY ARE YOU TO NOD OFF OR FALL ASLEEP WHILE WATCHING TV: 0
HOW LIKELY ARE YOU TO NOD OFF OR FALL ASLEEP WHILE SITTING QUIETLY AFTER LUNCH WITHOUT ALCOHOL: 1
HOW LIKELY ARE YOU TO NOD OFF OR FALL ASLEEP WHILE SITTING AND TALKING TO SOMEONE: 0
HOW LIKELY ARE YOU TO NOD OFF OR FALL ASLEEP IN A CAR, WHILE STOPPED FOR A FEW MINUTES IN TRAFFIC: 0
HOW LIKELY ARE YOU TO NOD OFF OR FALL ASLEEP WHEN YOU ARE A PASSENGER IN A CAR FOR AN HOUR WITHOUT A BREAK: 0
HOW LIKELY ARE YOU TO NOD OFF OR FALL ASLEEP WHILE SITTING AND READING: 0
HOW LIKELY ARE YOU TO NOD OFF OR FALL ASLEEP WHILE SITTING INACTIVE IN A PUBLIC PLACE: 0
HOW LIKELY ARE YOU TO NOD OFF OR FALL ASLEEP WHILE LYING DOWN TO REST IN THE AFTERNOON WHEN CIRCUMSTANCES PERMIT: 3

## 2019-06-20 NOTE — PROGRESS NOTES
Enedina Ch  1950  Referring Provider: Dr. Nila Valderrama    Subjective:     Chief Complaint   Patient presents with    Sleep Apnea    6 Month Follow-Up       HPI  Edman Denver is a 71 y.o. male has come back as a follow up. He was last seen in the hospital for EZEKIEL. He was on the CPAP for 9 months. He has been using it every night 8 to 10 hours. He says that it is helping him. His 2 week download data shows that his residual AHI is 0.4 ad leak is 13.7. He has no loss of weight. He is not sleepy during the day time. He has a FFM. Current Outpatient Medications   Medication Sig Dispense Refill    losartan (COZAAR) 25 MG tablet Take 1 tablet by mouth every morning 30 tablet 3    sotalol (BETAPACE) 80 MG tablet Take 1 tablet by mouth 2 times daily 60 tablet 3    carvedilol (COREG) 25 MG tablet Take 1 tablet by mouth 2 times daily (with meals) 60 tablet 3    levocetirizine (XYZAL) 5 MG tablet Take 5 mg by mouth nightly      Multiple Vitamins-Minerals (ALIVE ENERGY 50+ PO) Take by mouth daily      betamethasone dipropionate (DIPROLENE) 0.05 % cream Apply topically daily Apply topically 2 times daily.  NONFORMULARY daily triaminicolene ointment      vitamin D (CHOLECALCIFEROL) 1000 UNIT TABS tablet Take 10,000 Units by mouth once a week       glipiZIDE (GLUCOTROL) 5 MG tablet Take 5 mg by mouth 2 times daily (before meals)      metFORMIN (GLUCOPHAGE) 500 MG tablet Take 1,000 mg by mouth 2 times daily (with meals)      pravastatin (PRAVACHOL) 40 MG tablet Take 40 mg by mouth daily       PARoxetine (PAXIL) 20 MG tablet Take 20 mg by mouth every morning      aspirin EC 81 MG EC tablet Take 1 tablet by mouth daily 30 tablet 3    apixaban (ELIQUIS) 5 MG TABS tablet Take 1 tablet by mouth 2 times daily 60 tablet 11     No current facility-administered medications for this encounter.         Allergies   Allergen Reactions    Ace Inhibitors Other (See Comments)     Other reaction(s): Cough  Mr. Vangie Trinh tried off and on and symptoms continued to correlate with being on therapy.  Lipitor [Atorvastatin] Hives    Hydrochlorothiazide Rash    Other Other (See Comments)     Redness/blisters from tegaderm film       Past Medical History:   Diagnosis Date    [facility] ([facility]     Arthritis     left hip    Broken ankle     right     Cancer ([facility] 1996    adenocarcinoma     Diabetes mellitus ([facility]     Hyperlipidemia     Hypertension     Psoriasis     Restless leg syndrome     Sleep apnea     recently tested for sleep apnea but doesnt have results yet, was told he probably has it    Uses hearing aid     bilat       Past Surgical History:   Procedure Laterality Date    CARDIAC CATHETERIZATION  2016    no stents needed    COLONOSCOPY  2018    CYSTOSCOPY  1980s    HAND SURGERY Right 1970    blood clot removed from palm of hand from hitting hard surface    HAND TENDON SURGERY Right [facility]    tumors removed from roof of mouth \"benign\"   [address]    tumor removed, adenocarcinoma    PACEMAKER INSERTION Left 2018    ROTATOR CUFF REPAIR Left     UMBILICAL HERNIA REPAIR  1970s       Social History     Socioeconomic History    Marital status:      Spouse name: Not on file    Number of children: Not on file    Years of education: Not on file    Highest education level: Not on file   Occupational History    Not on file   Social Needs    Financial resource strain: Not on file    Food insecurity:     Worry: Not on file     Inability: Not on file    Transportation needs:     Medical: Not on file     Non-medical: Not on file   Tobacco Use    Smoking status: Former Smoker     Packs/day: 2.00     Years: 26.00     Pack years: 52.00     Types: Cigarettes     Start date:      Last attempt to quit: 1992     Years since quittin.9    Smokeless tobacco: Never Used   Substance and Sexual Activity    Alcohol use:  Yes     Alcohol/week: 8.4 oz     Types: 14 Shots of liquor per week     Comment: about 2 shots a night for 20 years    Drug use: No    Sexual activity: Not on file   Lifestyle    Physical activity:     Days per week: Not on file     Minutes per session: Not on file    Stress: Not on file   Relationships    Social connections:     Talks on phone: Not on file     Gets together: Not on file     Attends Anabaptism service: Not on file     Active member of club or organization: Not on file     Attends meetings of clubs or organizations: Not on file     Relationship status: Not on file    Intimate partner violence:     Fear of current or ex partner: Not on file     Emotionally abused: Not on file     Physically abused: Not on file     Forced sexual activity: Not on file   Other Topics Concern    Not on file   Social History Narrative    Not on file       Review of Systems   Constitutional: Negative for fatigue. HENT: Negative for congestion and postnasal drip. Eyes: Negative for discharge and itching. Respiratory: Negative for cough and shortness of breath. Cardiovascular: Negative for chest pain and leg swelling. Gastrointestinal: Negative for abdominal distention and abdominal pain. Endocrine: Negative for cold intolerance and heat intolerance. Genitourinary: Negative for enuresis and flank pain. Musculoskeletal: Negative for arthralgias and back pain. Allergic/Immunologic: Negative for food allergies. Neurological: Negative for light-headedness and headaches. Hematological: Negative for adenopathy. Psychiatric/Behavioral: Negative for agitation and behavioral problems. Objective: There were no vitals taken for this visit. There is no height or weight on file to calculate BMI.   Sleep Medicine 6/20/2019 10/10/2018 8/1/2018 8/1/2018   Sitting and reading 0 0 - 0   Watching TV 0 1 - 3   Sitting, inactive in a public place (e.g. a theatre or a meeting) 0 0 - 2   As a passenger in a car for an hour without a break 0 0 - 1   Lying down to rest in the afternoon when circumstances permit 3 3 - 3   Sitting and talking to someone 0 0 - 0   Sitting quietly after a lunch without alcohol 1 0 - 2   In a car, while stopped for a few minutes in traffic 0 0 - 0   Total score 4 4 - 11   Neck circumference - - 19.5 19.5     {MALLAMPATI:3    Physical Exam   Constitutional: He is oriented to person, place, and time. He appears well-developed and well-nourished. Obesity   HENT:   Head: Normocephalic and atraumatic. Eyes: Pupils are equal, round, and reactive to light. EOM are normal.   Neck: Normal range of motion. Neck supple. Cardiovascular: Normal rate, regular rhythm and normal heart sounds. Pulmonary/Chest: Effort normal and breath sounds normal.   Abdominal: Soft. Bowel sounds are normal.   Musculoskeletal: Normal range of motion. Neurological: He is alert and oriented to person, place, and time. Skin: Skin is warm and dry. Psychiatric: He has a normal mood and affect. His behavior is normal.   Vitals reviewed. Radiology: None    Assessment and Plan     Problem List     EZEKIEL (obstructive sleep apnea)     Advised to be compliant with the CPAP  Loose weight         Ex-cigarette smoker     Advised to c/w quitting smoking         Dyspnea on exertion     Loose weight with diet and exercise           Morbid obesity ([address]     Advised to loose weight with diet and exercise           Relevant Medications    glipiZIDE (GLUCOTROL) 5 MG tablet    metFORMIN (GLUCOPHAGE) 500 MG tablet               No follow-ups on file.      Progress notes sent to the referring Provider    Rosetta Sheffield MD  6/20/2019  9:58 AM

## 2020-06-15 ENCOUNTER — OFFICE VISIT (OUTPATIENT)
Dept: PULMONOLOGY | Age: 70
End: 2020-06-15
Payer: COMMERCIAL

## 2020-06-15 VITALS
WEIGHT: 270.8 LBS | HEIGHT: 69 IN | SYSTOLIC BLOOD PRESSURE: 124 MMHG | DIASTOLIC BLOOD PRESSURE: 68 MMHG | BODY MASS INDEX: 40.11 KG/M2 | HEART RATE: 92 BPM | OXYGEN SATURATION: 97 % | TEMPERATURE: 97.3 F

## 2020-06-15 PROBLEM — G47.10 HYPERSOMNIA: Status: ACTIVE | Noted: 2020-06-15

## 2020-06-15 PROCEDURE — 99213 OFFICE O/P EST LOW 20 MIN: CPT | Performed by: INTERNAL MEDICINE

## 2020-06-15 ASSESSMENT — ENCOUNTER SYMPTOMS
ABDOMINAL DISTENTION: 0
ABDOMINAL PAIN: 0
COUGH: 0
BACK PAIN: 0
EYE ITCHING: 0
SHORTNESS OF BREATH: 0
EYE DISCHARGE: 0

## 2020-06-15 NOTE — PROGRESS NOTES
when circumstances permit 3 3 - 3   Sitting and talking to someone 0 0 - 0   Sitting quietly after a lunch without alcohol 1 0 - 2   In a car, while stopped for a few minutes in traffic 0 0 - 0   Total score 4 4 - 11   Neck circumference - - 19.5 19.5     {MALLAMPATI:3    Physical Exam  Vitals signs reviewed. Constitutional:       Appearance: Normal appearance. Comments: Obesity   HENT:      Head: Normocephalic and atraumatic. Nose: Nose normal.      Mouth/Throat:      Mouth: Mucous membranes are moist.   Eyes:      Extraocular Movements: Extraocular movements intact. Pupils: Pupils are equal, round, and reactive to light. Neck:      Musculoskeletal: Normal range of motion and neck supple. Cardiovascular:      Rate and Rhythm: Normal rate and regular rhythm. Pulses: Normal pulses. Heart sounds: Normal heart sounds. Pulmonary:      Effort: Pulmonary effort is normal.      Breath sounds: Normal breath sounds. Abdominal:      General: Abdomen is flat. Palpations: Abdomen is soft. Musculoskeletal: Normal range of motion. Skin:     General: Skin is warm and dry. Neurological:      General: No focal deficit present. Mental Status: He is alert and oriented to person, place, and time.    Psychiatric:         Mood and Affect: Mood normal.         Behavior: Behavior normal.         Radiology: None    Assessment and Plan     Problem List        Pulmonary Problems    EZEKIEL (obstructive sleep apnea)     Advised to be compliant with the CPAP  Loose weight         Dyspnea on exertion     Loose weight  CHF optimization            Other    Ex-cigarette smoker     Advised to c/w quitting smoking         Morbid obesity ([address]     Advised to loose weight with diet and exercise           Relevant Medications    glipiZIDE (GLUCOTROL) 5 MG tablet    metFORMIN (GLUCOPHAGE) 500 MG tablet    Hypersomnia     Advised to be compliant with the CPAP  Loose weight                    Return in about 1

## 2021-05-17 ENCOUNTER — TELEPHONE (OUTPATIENT)
Dept: PULMONOLOGY | Age: 71
End: 2021-05-17

## 2021-05-17 NOTE — TELEPHONE ENCOUNTER
Patient called stating that he has not been able to receive his supplies through [employer] and he has been waiting for months. He would like to go to [facility] as an alternative if possible. He states his wife uses this company and is able to have supplies shipped to their house.

## 2021-05-24 ENCOUNTER — VIRTUAL VISIT (OUTPATIENT)
Dept: PULMONOLOGY | Age: 71
End: 2021-05-24
Payer: MEDICARE

## 2021-05-24 DIAGNOSIS — G47.33 OSA (OBSTRUCTIVE SLEEP APNEA): ICD-10-CM

## 2021-05-24 DIAGNOSIS — G47.10 HYPERSOMNIA: ICD-10-CM

## 2021-05-24 DIAGNOSIS — Z87.891 EX-CIGARETTE SMOKER: ICD-10-CM

## 2021-05-24 DIAGNOSIS — E66.01 MORBID OBESITY (HCC): ICD-10-CM

## 2021-05-24 PROCEDURE — 99443 PR PHYS/QHP TELEPHONE EVALUATION 21-30 MIN: CPT | Performed by: INTERNAL MEDICINE

## 2021-05-24 ASSESSMENT — ENCOUNTER SYMPTOMS
EYE ITCHING: 0
SHORTNESS OF BREATH: 0
COUGH: 0
EYE DISCHARGE: 0
BACK PAIN: 0
ABDOMINAL PAIN: 0
ABDOMINAL DISTENTION: 0

## 2021-05-24 NOTE — PROGRESS NOTES
Abram Plata  1950  Referring Provider: Dr. Mae Ling    Subjective:     Chief Complaint   Patient presents with    Sleep Apnea     wants to transfer [facility] companies       [facility]  Nilam Roque is a 70 y.o. male is doing a telephone follow up visit. He has EZEKIEL on CPAP of 12 cm h20. He is using it every night about 8 to 10 hours. He says that it is helping him. He is not sleepy during the day time. He has no loss of weight. He has a FFM. His 2 week download data showed that his residual AHI is 0.7 and leak is 14.5     Current Outpatient Medications   Medication Sig Dispense Refill    losartan (COZAAR) 25 MG tablet Take 1 tablet by mouth every morning 30 tablet 3    sotalol (BETAPACE) 80 MG tablet Take 1 tablet by mouth 2 times daily 60 tablet 3    carvedilol (COREG) 25 MG tablet Take 1 tablet by mouth 2 times daily (with meals) 60 tablet 3    levocetirizine (XYZAL) 5 MG tablet Take 5 mg by mouth nightly      Multiple Vitamins-Minerals (ALIVE ENERGY 50+ PO) Take by mouth daily      betamethasone dipropionate (DIPROLENE) 0.05 % cream Apply topically daily Apply topically 2 times daily.  NONFORMULARY daily triaminicolene ointment      vitamin D (CHOLECALCIFEROL) 1000 UNIT TABS tablet Take 10,000 Units by mouth once a week       glipiZIDE (GLUCOTROL) 5 MG tablet Take 5 mg by mouth 2 times daily (before meals)      metFORMIN (GLUCOPHAGE) 500 MG tablet Take 1,000 mg by mouth 2 times daily (with meals)      pravastatin (PRAVACHOL) 40 MG tablet Take 40 mg by mouth daily       PARoxetine (PAXIL) 20 MG tablet Take 20 mg by mouth every morning      aspirin EC 81 MG EC tablet Take 1 tablet by mouth daily 30 tablet 3    apixaban (ELIQUIS) 5 MG TABS tablet Take 1 tablet by mouth 2 times daily 60 tablet 11     No current facility-administered medications for this visit.        Allergies   Allergen Reactions    Ace Inhibitors Other (See Comments)     Other reaction(s): Cough  Mr. Juan Francisco Winter tried off and on and symptoms continued to correlate with being on therapy.  Lipitor [Atorvastatin] Hives    Hydrochlorothiazide Rash    Other Other (See Comments)     Redness/blisters from tegaderm film       Past Medical History:   Diagnosis Date    [facility] ([facility]     Arthritis     left hip    Broken ankle     right     Cancer ([facility] [facility] 1996    adenocarcinoma     Diabetes mellitus ([facility]     Hyperlipidemia     Hypertension     Psoriasis     Restless leg syndrome     Sleep apnea     recently tested for sleep apnea but doesnt have results yet, was told he probably has it    Uses hearing aid     bilat       Past Surgical History:   Procedure Laterality Date    CARDIAC CATHETERIZATION  2016    no stents needed    COLONOSCOPY  2018    CYSTOSCOPY  1980s    HAND SURGERY Right 1970    blood clot removed from palm of hand from hitting hard surface    HAND TENDON SURGERY Right [facility]    tumors removed from roof of mouth \"benign\"   [address]    tumor removed, adenocarcinoma    PACEMAKER INSERTION Left 2018    ROTATOR CUFF REPAIR Left     UMBILICAL HERNIA REPAIR  1970s       Social History     Socioeconomic History    Marital status:      Spouse name: Not on file    Number of children: Not on file    Years of education: Not on file    Highest education level: Not on file   Occupational History    Not on file   Tobacco Use    Smoking status: Former Smoker     Packs/day: 2.00     Years: 26.00     Pack years: 52.00     Types: Cigarettes     Start date:      Quit date: 1992     Years since quittin.8    Smokeless tobacco: Never Used   Vaping Use    Vaping Use: Never used   Substance and Sexual Activity    Alcohol use:  Yes     Alcohol/week: 14.0 standard drinks     Types: 14 Shots of liquor per week     Comment: about 2 shots a night for 20 years    Drug use: No    Sexual activity: Not on file   Other Topics Concern    Not on file   Social History Narrative    Not on file     Social Determinants of Health     Financial Resource Strain:     Difficulty of Paying Living Expenses:    Food Insecurity:     Worried About Running Out of Food in the Last Year:     [address] in the Last Year:    Transportation Needs:     Lack of Transportation (Medical):  Lack of Transportation (Non-Medical):    Physical Activity:     Days of Exercise per Week:     Minutes of Exercise per Session:    Stress:     Feeling of Stress :    Social Connections:     Frequency of Communication with Friends and Family:     Frequency of Social Gatherings with Friends and Family:     Attends Mandaen Services:     Active Member of Clubs or Organizations:     Attends Club or Organization Meetings:     Marital Status:    Intimate Partner Violence:     Fear of Current or Ex-Partner:     Emotionally Abused:     Physically Abused:     Sexually Abused:        Review of Systems   Constitutional: Negative for fatigue. HENT: Negative for congestion and postnasal drip. Eyes: Negative for discharge and itching. Respiratory: Negative for cough and shortness of breath. Cardiovascular: Negative for chest pain and leg swelling. Gastrointestinal: Negative for abdominal distention and abdominal pain. Endocrine: Negative for cold intolerance and heat intolerance. Genitourinary: Negative for enuresis and frequency. Musculoskeletal: Negative for arthralgias and back pain. Allergic/Immunologic: Negative for environmental allergies and food allergies. Neurological: Negative for light-headedness and headaches. Hematological: Negative for adenopathy. Psychiatric/Behavioral: Negative for agitation and behavioral problems. Objective: There were no vitals taken for this visit. There is no height or weight on file to calculate BMI.   Sleep Medicine 6/20/2019 10/10/2018 8/1/2018 8/1/2018   Sitting and reading 0 0 - 0   Watching TV 0 1 - 3   Sitting, inactive in a public place (e.g. a theatre or a meeting) 0 0 - 2   As a passenger in a car for an hour without a break 0 0 - 1   Lying down to rest in the afternoon when circumstances permit 3 3 - 3   Sitting and talking to someone 0 0 - 0   Sitting quietly after a lunch without alcohol 1 0 - 2   In a car, while stopped for a few minutes in traffic 0 0 - 0   Total score 4 4 - 11   Neck circumference - - 19.5 19.5       Radiology: None    Assessment and Plan     Problem List        Pulmonary Problems    EZEKIEL (obstructive sleep apnea)     Advised to be compliant with the CPAP  Loose weight            Other    Ex-cigarette smoker     Advised to c/w quitting smoking         Morbid obesity ([facility]     Advised to loose weight with diet and exercise           Relevant Medications    glipiZIDE (GLUCOTROL) 5 MG tablet    metFORMIN (GLUCOPHAGE) 500 MG tablet    Hypersomnia     Advised to be compliant with the CPAP  Loose weight                    Return in about 1 year (around 5/24/2022) for 2 week download data. Progress notes sent to the referring Provider    Skye Fernandez is a 70 y.o. male being evaluated by a Virtual Visit (video visit) encounter to address concerns as mentioned above. A caregiver was present when appropriate. Due to this being a TeleHealth encounter (During SXLCO-77 public health emergency), evaluation of the following organ systems was limited: Vitals/Constitutional/EENT/Resp/CV/GI//MS/Neuro/Skin/Heme-Lymph-Imm. Pursuant to the emergency declaration under the [address] [place] [address], [address] [place] [address] and the [employer] and [employer] Act, this Virtual Visit was conducted with patient's (and/or legal guardian's) consent, to reduce the patient's risk of exposure to COVID-19 and provide necessary medical care.   The patient (and/or legal guardian) has also been advised to contact this office for worsening conditions or problems, and seek emergency medical treatment and/or call 911 if deemed necessary. Patient identification was verified at the start of the visit: Yes    Total time spent for this encounter: 21 minutes    Services were provided through a video synchronous discussion virtually to substitute for in-person clinic visit. Patient and provider were located at their individual homes. --Shannon Turner MD on 5/24/2021 at 2:38 PM    An electronic signature was used to authenticate this note.      Shannon Turner MD MD  5/24/2021  2:38 PM

## 2024-08-20 ENCOUNTER — OFFICE VISIT (OUTPATIENT)
Dept: PULMONOLOGY | Age: 74
End: 2024-08-20
Payer: MEDICARE

## 2024-08-20 VITALS
HEIGHT: 69 IN | DIASTOLIC BLOOD PRESSURE: 56 MMHG | HEART RATE: 72 BPM | SYSTOLIC BLOOD PRESSURE: 116 MMHG | BODY MASS INDEX: 42.95 KG/M2 | WEIGHT: 290 LBS | OXYGEN SATURATION: 96 %

## 2024-08-20 DIAGNOSIS — G47.33 OSA (OBSTRUCTIVE SLEEP APNEA): Primary | ICD-10-CM

## 2024-08-20 DIAGNOSIS — G47.10 HYPERSOMNIA: ICD-10-CM

## 2024-08-20 DIAGNOSIS — Z87.891 EX-CIGARETTE SMOKER: ICD-10-CM

## 2024-08-20 DIAGNOSIS — E66.01 MORBID OBESITY WITH BMI OF 40.0-44.9, ADULT (HCC): ICD-10-CM

## 2024-08-20 PROCEDURE — 99204 OFFICE O/P NEW MOD 45 MIN: CPT | Performed by: INTERNAL MEDICINE

## 2024-08-20 PROCEDURE — G8417 CALC BMI ABV UP PARAM F/U: HCPCS | Performed by: INTERNAL MEDICINE

## 2024-08-20 PROCEDURE — 1123F ACP DISCUSS/DSCN MKR DOCD: CPT | Performed by: INTERNAL MEDICINE

## 2024-08-20 PROCEDURE — 4004F PT TOBACCO SCREEN RCVD TLK: CPT | Performed by: INTERNAL MEDICINE

## 2024-08-20 PROCEDURE — G8427 DOCREV CUR MEDS BY ELIG CLIN: HCPCS | Performed by: INTERNAL MEDICINE

## 2024-08-20 PROCEDURE — 3017F COLORECTAL CA SCREEN DOC REV: CPT | Performed by: INTERNAL MEDICINE

## 2024-08-20 RX ORDER — FUROSEMIDE 40 MG/1
40 TABLET ORAL DAILY
COMMUNITY
Start: 2024-08-01

## 2024-08-20 RX ORDER — ACETAMINOPHEN 325 MG/1
325 TABLET ORAL EVERY 4 HOURS PRN
COMMUNITY

## 2024-08-20 ASSESSMENT — ENCOUNTER SYMPTOMS
ABDOMINAL DISTENTION: 0
BACK PAIN: 0
COUGH: 0
ABDOMINAL PAIN: 0
EYE DISCHARGE: 0
EYE ITCHING: 0
SHORTNESS OF BREATH: 0

## 2024-08-20 NOTE — PROGRESS NOTES
Josep Cervantes  1950  Referring Provider: Raymon Vincent - General     Subjective:     Chief Complaint   Patient presents with    Follow-up     F/u for med supplies        HPI  Josep is a 74 y.o. male has been referred for the EZEKIEL and Afib. He is on the CPAP which he has been using it for more than 4 years. He has Afib still. He is using it for 8 to 10 hours. He says that it is helping him.He has a FFM. He has 20 lb weight gain. He is less sleepy during the day time. Await download data.       He has a 2 pks/day x 20 yrs quit in 1992. He has no symptoms. He is not on oxygen. He is not on inhalers.He had his flu vaccine.         Current Outpatient Medications   Medication Sig Dispense Refill    acetaminophen (TYLENOL) 325 MG tablet Take 1 tablet by mouth every 4 hours as needed      furosemide (LASIX) 40 MG tablet Take 1 tablet by mouth daily      losartan (COZAAR) 25 MG tablet Take 1 tablet by mouth every morning 30 tablet 3    carvedilol (COREG) 25 MG tablet Take 1 tablet by mouth 2 times daily (with meals) 60 tablet 3    vitamin D (CHOLECALCIFEROL) 1000 UNIT TABS tablet Take 10 tablets by mouth once a week      glipiZIDE (GLUCOTROL) 5 MG tablet Take 1 tablet by mouth 2 times daily (before meals)      metFORMIN (GLUCOPHAGE) 500 MG tablet Take 2 tablets by mouth 2 times daily (with meals)      pravastatin (PRAVACHOL) 40 MG tablet Take 1 tablet by mouth daily      PARoxetine (PAXIL) 20 MG tablet Take 1 tablet by mouth every morning      aspirin EC 81 MG EC tablet Take 1 tablet by mouth daily 30 tablet 3    apixaban (ELIQUIS) 5 MG TABS tablet Take 1 tablet by mouth 2 times daily 60 tablet 11    sotalol (BETAPACE) 80 MG tablet Take 1 tablet by mouth 2 times daily (Patient not taking: Reported on 8/20/2024) 60 tablet 3    levocetirizine (XYZAL) 5 MG tablet Take 5 mg by mouth nightly (Patient not taking: Reported on 8/20/2024)      Multiple Vitamins-Minerals (ALIVE ENERGY 50+ PO) Take by mouth daily (Patient not